# Patient Record
Sex: FEMALE | Race: OTHER | URBAN - METROPOLITAN AREA
[De-identification: names, ages, dates, MRNs, and addresses within clinical notes are randomized per-mention and may not be internally consistent; named-entity substitution may affect disease eponyms.]

---

## 2020-05-15 LAB
ANTIBODY SCREEN, EXTERNAL: NEGATIVE
CHLAMYDIA, EXTERNAL: NEGATIVE
HBSAG, EXTERNAL: NEGATIVE
HCT, EXTERNAL: 37.3
HGB, EXTERNAL: 12.9
HIV, EXTERNAL: NEGATIVE
N. GONORRHEA, EXTERNAL: NEGATIVE
RUBELLA, EXTERNAL: NORMAL
T. PALLIDUM, EXTERNAL: NEGATIVE
TYPE, ABO & RH, EXTERNAL: NORMAL

## 2020-08-07 LAB — GTT, 1 HR, GLUCOLA, EXTERNAL: 148

## 2020-08-18 LAB
GTT 120 MIN, EXTERNAL: 96
GTT 180 MIN, EXTERNAL: 114
GTT 60 MIN, EXTERNAL: 150
GTT, FASTING, EXTERNAL: 73

## 2020-10-30 LAB — GRBS, EXTERNAL: NEGATIVE

## 2020-11-13 ENCOUNTER — TRANSCRIBE ORDER (OUTPATIENT)
Dept: REGISTRATION | Age: 30
End: 2020-11-13

## 2020-11-13 ENCOUNTER — HOSPITAL ENCOUNTER (OUTPATIENT)
Dept: LAB | Age: 30
Discharge: HOME | End: 2020-11-13
Payer: MEDICAID

## 2020-11-13 DIAGNOSIS — U07.1 COVID-19: ICD-10-CM

## 2020-11-13 DIAGNOSIS — U07.1 COVID-19: Primary | ICD-10-CM

## 2020-11-13 PROCEDURE — 87635 SARS-COV-2 COVID-19 AMP PRB: CPT

## 2020-11-14 LAB — SARS-COV-2, COV2NT: NOT DETECTED

## 2020-11-21 ENCOUNTER — HOSPITAL ENCOUNTER (INPATIENT)
Age: 30
LOS: 2 days | Discharge: HOME | DRG: 560 | End: 2020-11-23
Attending: OBSTETRICS & GYNECOLOGY | Admitting: OBSTETRICS & GYNECOLOGY
Payer: MEDICAID

## 2020-11-21 LAB
BASOPHILS # BLD: 0 K/UL (ref 0–0.1)
BASOPHILS NFR BLD: 0 % (ref 0–1)
DIFFERENTIAL METHOD BLD: ABNORMAL
EOSINOPHIL # BLD: 0 K/UL (ref 0–0.4)
EOSINOPHIL NFR BLD: 0 % (ref 0–7)
ERYTHROCYTE [DISTWIDTH] IN BLOOD BY AUTOMATED COUNT: 13.3 % (ref 11.5–14.5)
HCT VFR BLD AUTO: 35.3 % (ref 35–47)
HGB BLD-MCNC: 11.5 G/DL (ref 11.5–16)
IMM GRANULOCYTES # BLD AUTO: 0.1 K/UL (ref 0–0.04)
IMM GRANULOCYTES NFR BLD AUTO: 1 % (ref 0–0.5)
LYMPHOCYTES # BLD: 1.7 K/UL (ref 0.8–3.5)
LYMPHOCYTES NFR BLD: 10 % (ref 12–49)
MCH RBC QN AUTO: 28 PG (ref 26–34)
MCHC RBC AUTO-ENTMCNC: 32.6 G/DL (ref 30–36.5)
MCV RBC AUTO: 86.1 FL (ref 80–99)
MONOCYTES # BLD: 0.7 K/UL (ref 0–1)
MONOCYTES NFR BLD: 4 % (ref 5–13)
NEUTS SEG # BLD: 14.1 K/UL (ref 1.8–8)
NEUTS SEG NFR BLD: 85 % (ref 32–75)
NRBC # BLD: 0 K/UL (ref 0–0.01)
NRBC BLD-RTO: 0 PER 100 WBC
PLATELET # BLD AUTO: 257 K/UL (ref 150–400)
PMV BLD AUTO: 10.4 FL (ref 8.9–12.9)
RBC # BLD AUTO: 4.1 M/UL (ref 3.8–5.2)
WBC # BLD AUTO: 16.6 K/UL (ref 3.6–11)

## 2020-11-21 PROCEDURE — 75810000275 HC EMERGENCY DEPT VISIT NO LEVEL OF CARE

## 2020-11-21 PROCEDURE — 75410000003 HC RECOV DEL/VAG/CSECN EA 0.5 HR: Performed by: OBSTETRICS & GYNECOLOGY

## 2020-11-21 PROCEDURE — 74011250636 HC RX REV CODE- 250/636: Performed by: OBSTETRICS & GYNECOLOGY

## 2020-11-21 PROCEDURE — 65270000029 HC RM PRIVATE

## 2020-11-21 PROCEDURE — 74011000250 HC RX REV CODE- 250: Performed by: OBSTETRICS & GYNECOLOGY

## 2020-11-21 PROCEDURE — 36415 COLL VENOUS BLD VENIPUNCTURE: CPT

## 2020-11-21 PROCEDURE — 74011250637 HC RX REV CODE- 250/637: Performed by: OBSTETRICS & GYNECOLOGY

## 2020-11-21 PROCEDURE — 0KQM0ZZ REPAIR PERINEUM MUSCLE, OPEN APPROACH: ICD-10-PCS | Performed by: OBSTETRICS & GYNECOLOGY

## 2020-11-21 PROCEDURE — 99282 EMERGENCY DEPT VISIT SF MDM: CPT

## 2020-11-21 PROCEDURE — 75410000002 HC LABOR FEE PER 1 HR: Performed by: OBSTETRICS & GYNECOLOGY

## 2020-11-21 PROCEDURE — 75410000000 HC DELIVERY VAGINAL/SINGLE: Performed by: OBSTETRICS & GYNECOLOGY

## 2020-11-21 PROCEDURE — 85025 COMPLETE CBC W/AUTO DIFF WBC: CPT

## 2020-11-21 PROCEDURE — 77030021125

## 2020-11-21 RX ORDER — OXYTOCIN/RINGER'S LACTATE 30/500 ML
10 PLASTIC BAG, INJECTION (ML) INTRAVENOUS AS NEEDED
Status: DISCONTINUED | OUTPATIENT
Start: 2020-11-21 | End: 2020-11-23 | Stop reason: HOSPADM

## 2020-11-21 RX ORDER — METHYLERGONOVINE MALEATE 0.2 MG/ML
0.2 INJECTION INTRAVENOUS ONCE
Status: COMPLETED | OUTPATIENT
Start: 2020-11-21 | End: 2020-11-21

## 2020-11-21 RX ORDER — PEPPERMINT OIL
SPIRIT ORAL
Status: DISPENSED
Start: 2020-11-21 | End: 2020-11-21

## 2020-11-21 RX ORDER — SODIUM CHLORIDE 0.9 % (FLUSH) 0.9 %
5-40 SYRINGE (ML) INJECTION EVERY 8 HOURS
Status: DISCONTINUED | OUTPATIENT
Start: 2020-11-21 | End: 2020-11-21

## 2020-11-21 RX ORDER — SIMETHICONE 80 MG
80 TABLET,CHEWABLE ORAL
Status: DISCONTINUED | OUTPATIENT
Start: 2020-11-21 | End: 2020-11-23 | Stop reason: HOSPADM

## 2020-11-21 RX ORDER — NALOXONE HYDROCHLORIDE 0.4 MG/ML
0.4 INJECTION, SOLUTION INTRAMUSCULAR; INTRAVENOUS; SUBCUTANEOUS AS NEEDED
Status: DISCONTINUED | OUTPATIENT
Start: 2020-11-21 | End: 2020-11-23 | Stop reason: HOSPADM

## 2020-11-21 RX ORDER — OXYTOCIN/RINGER'S LACTATE 30/500 ML
95 PLASTIC BAG, INJECTION (ML) INTRAVENOUS AS NEEDED
Status: DISCONTINUED | OUTPATIENT
Start: 2020-11-21 | End: 2020-11-23 | Stop reason: HOSPADM

## 2020-11-21 RX ORDER — ONDANSETRON 2 MG/ML
4 INJECTION INTRAMUSCULAR; INTRAVENOUS
Status: DISCONTINUED | OUTPATIENT
Start: 2020-11-21 | End: 2020-11-21 | Stop reason: HOSPADM

## 2020-11-21 RX ORDER — NALOXONE HYDROCHLORIDE 0.4 MG/ML
0.4 INJECTION, SOLUTION INTRAMUSCULAR; INTRAVENOUS; SUBCUTANEOUS AS NEEDED
Status: DISCONTINUED | OUTPATIENT
Start: 2020-11-21 | End: 2020-11-21 | Stop reason: HOSPADM

## 2020-11-21 RX ORDER — IBUPROFEN 800 MG/1
800 TABLET ORAL EVERY 8 HOURS
Status: DISCONTINUED | OUTPATIENT
Start: 2020-11-21 | End: 2020-11-23 | Stop reason: HOSPADM

## 2020-11-21 RX ORDER — DOCUSATE SODIUM 100 MG/1
100 CAPSULE, LIQUID FILLED ORAL
Status: DISCONTINUED | OUTPATIENT
Start: 2020-11-21 | End: 2020-11-23 | Stop reason: HOSPADM

## 2020-11-21 RX ORDER — OXYCODONE AND ACETAMINOPHEN 5; 325 MG/1; MG/1
1 TABLET ORAL
Status: DISCONTINUED | OUTPATIENT
Start: 2020-11-21 | End: 2020-11-23 | Stop reason: HOSPADM

## 2020-11-21 RX ORDER — SODIUM CHLORIDE 0.9 % (FLUSH) 0.9 %
5-40 SYRINGE (ML) INJECTION AS NEEDED
Status: DISCONTINUED | OUTPATIENT
Start: 2020-11-21 | End: 2020-11-23 | Stop reason: HOSPADM

## 2020-11-21 RX ORDER — OXYTOCIN/RINGER'S LACTATE 30/500 ML
10 PLASTIC BAG, INJECTION (ML) INTRAVENOUS AS NEEDED
Status: COMPLETED | OUTPATIENT
Start: 2020-11-21 | End: 2020-11-21

## 2020-11-21 RX ORDER — HYDROCORTISONE ACETATE PRAMOXINE HCL 2.5; 1 G/100G; G/100G
CREAM TOPICAL AS NEEDED
Status: DISCONTINUED | OUTPATIENT
Start: 2020-11-21 | End: 2020-11-21

## 2020-11-21 RX ORDER — ONDANSETRON 4 MG/1
4 TABLET, ORALLY DISINTEGRATING ORAL
Status: ACTIVE | OUTPATIENT
Start: 2020-11-21 | End: 2020-11-22

## 2020-11-21 RX ORDER — OXYTOCIN/RINGER'S LACTATE 30/500 ML
95 PLASTIC BAG, INJECTION (ML) INTRAVENOUS AS NEEDED
Status: COMPLETED | OUTPATIENT
Start: 2020-11-21 | End: 2020-11-21

## 2020-11-21 RX ORDER — SODIUM CHLORIDE, SODIUM LACTATE, POTASSIUM CHLORIDE, CALCIUM CHLORIDE 600; 310; 30; 20 MG/100ML; MG/100ML; MG/100ML; MG/100ML
125 INJECTION, SOLUTION INTRAVENOUS CONTINUOUS
Status: DISCONTINUED | OUTPATIENT
Start: 2020-11-21 | End: 2020-11-23 | Stop reason: HOSPADM

## 2020-11-21 RX ORDER — DIPHENHYDRAMINE HCL 25 MG
25 CAPSULE ORAL
Status: DISCONTINUED | OUTPATIENT
Start: 2020-11-21 | End: 2020-11-23 | Stop reason: HOSPADM

## 2020-11-21 RX ORDER — LIDOCAINE HYDROCHLORIDE 10 MG/ML
20 INJECTION INFILTRATION; PERINEURAL ONCE
Status: COMPLETED | OUTPATIENT
Start: 2020-11-21 | End: 2020-11-21

## 2020-11-21 RX ADMIN — IBUPROFEN 800 MG: 800 TABLET ORAL at 14:21

## 2020-11-21 RX ADMIN — LIDOCAINE HYDROCHLORIDE 20 ML: 10 INJECTION, SOLUTION INFILTRATION; PERINEURAL at 10:01

## 2020-11-21 RX ADMIN — OXYTOCIN 95 MILLI-UNITS/MIN: 10 INJECTION INTRAVENOUS at 10:30

## 2020-11-21 RX ADMIN — METHYLERGONOVINE MALEATE 0.2 MG: 0.2 INJECTION, SOLUTION INTRAMUSCULAR; INTRAVENOUS at 10:06

## 2020-11-21 RX ADMIN — SODIUM CHLORIDE, SODIUM LACTATE, POTASSIUM CHLORIDE, AND CALCIUM CHLORIDE 125 ML/HR: 600; 310; 30; 20 INJECTION, SOLUTION INTRAVENOUS at 08:13

## 2020-11-21 RX ADMIN — IBUPROFEN 800 MG: 800 TABLET ORAL at 22:47

## 2020-11-21 RX ADMIN — OXYTOCIN 10000 MILLI-UNITS: 10 INJECTION INTRAVENOUS at 09:58

## 2020-11-21 NOTE — PROGRESS NOTES
0739: Care assumed of pt at this time. SVE per this RN 7/100/0. Pt reassured, pt declines epidural.     0830: Pt in hands and knees, RN and family at bedside. Pt coping well. 2806: Pt reports urge to push, SVE per this RN 9.5/100/+1. Dr. Sherrie Bowden at bedside. MD made aware RN did not feel a bag of water during exams, Pt also reports she had a small amount of fluid come out at 0300 this morning. 0900: Pt beginning to push. Dr. Sherrie Bowden at bedside. 9058:  viable female infant. 7179: Delivery of placenta. MD repairing lacerations. 1000: Pitocin rate changed due to pp bleeding and ordered to be run at 999ml/hr. 1010: Repairs complete, karen-care provided, pads changed and ice pack applied to perineum. 1725: TRANSFER - OUT REPORT:    Verbal report [facility] Waldem[facility] [address]  being transferred to MIU(unit) for routine progression of care       Report consisted of patients Situation, Background, Assessment and   Recommendations(SBAR). Information from the following report(s) SBAR, Kardex, Intake/Output, MAR, Recent Results and Med Rec Status was reviewed with the receiving nurse. Lines:   Peripheral IV 20 Left Antecubital (Active)   Site Assessment Clean, dry, & intact 20 141   Phlebitis Assessment 0 20 1411   Infiltration Assessment 0 20   Dressing Status Clean, dry, & intact 20   Dressing Type Tape;Transparent 20 141   Hub Color/Line Status Green; Infusing 20 141        Opportunity for questions and clarification was provided.       Patient transported with:   Registered Nurse

## 2020-11-21 NOTE — L&D DELIVERY NOTE
Delivery Note:     Patient reached FD and pushed with good effort to deliver the fetal head in FERN position. Loose nuchal cord found and delivered through. The anterior shoulder, followed by the posterior shoulder and the rest of the body then delivered easily. This was a LBFI with Apgars of 9 and 9 at 1 and 5 minutes respectively, weight 2860g. The infant was placed on mom's abdomen. The cord was then double clamped and cut by the FOB. The placenta followed spontaneously, intact, with 3VC. Pitocin was added to the IVF and the fundus was firm to palpation. Subsequently fundus boggy with continued bleeding so methergine administered. The vagina, cervix and perineum were examined and second degree laceration was found and repaired with a 3-0 vicryl in a running fashion.  mL. No complications. Mom and baby doing well. Dr. Matteo Leal delivering.      Ana Laura Munoz MD  [facility]

## 2020-11-21 NOTE — DISCHARGE INSTRUCTIONS
Discharge Instructions for Vaginal Delivery    Patient ID:  Janis Cesar  [id number]  50 y.o.  1990    Take Home Medications       Continue taking your prenatal vitamins if you are breastfeeding. Follow-up care is a key part of your treatment and safety. Please schedule and keep appointments. Follow-up with your primary OB in 6 weeks. Activity  Avoid anything in your vagina for 6 weeks (no intercourse, tampons, or douching). You may drive unless you are taking prescription pain medications. Climbing stairs and light lifting are okay. Please avoid excessive exercise, though walking is okay- you'll be tired! Diet  Regular diet as tolerated. Be sure to drink plenty of fluids if you are breastfeeding. Wound care  If you have stitches, continue to rinse with a squirt bottle of warm water each time you void for about 7-10 days. .  Your stitches will gradually dissolve over four to eight weeks. Sitz baths are also helpful to keep the wound clean, encourage healing, and to help with pain associated with the stitches or hemorrhoids. You can use either a sitz bath basin or a bathtub filled with 2-3\" inches of plain warm water. Soak for 10 minutes 3 times a day as tolerated. Pain Management  1. Over the counter medications such as Tylenol and ibuprofen (Motrin or Advil) are ideal.  These may be taken together, alternating doses. You may  take the maximum dose:  Motrin or Advil (generic ibuprofen), either 3 tablets every 6 hours or 4 tablets every 8 hours or Tylenol (acetominophen) 1000mg every 6 hours (equivalent to 2 extra strength Tylenol). 2. You may also have a precrescription for stronger pain medication. Take only as needed and transition to over the counter medication in the next few days. Minimize amounts of the prescription medication, as it can be habit-forming and will worsen or cause constipation.  Most patients will find that within a couple of days, their pain is adequately controlled using only over-the-counter medications. 3. The prescription pain medication is mixed with Tylenol, therefore, you should not take any extra Tylenol or acetaminophen until you have reduced your prescription pain medication. 4. Add heating pad or sitz baths as needed. Add hemorrhoid wipes or ointments if needed    Constipation  1. Constipation is normal after pregnancy and delivery, especially while taking prescription narcotic pain medication. 2. Over the counter remedies including ducosate (Colace), take 1-2 capsules 1-2 times daily for soft stool as needed. You may also add/ try milk of magnesia or rectal remedies such as Dulcolax or Fleets enema. Recovery: What to Expect at Home  1. Fatigue is expected. Try to rest when you can and don't worry about doing housework or other tasks which can wait. 2. The soreness along your bottom will improve significantly over the first 2 weeks, but it may take 6 weeks before you are completely recovered. 3. Back pain or general body aches or muscle soreness are expected and should improve with acetominophen or ibuprofen. 4. Leg swelling due to pregnancy and/or IV fluids given in the hospital will take about two weeks to resolve. 5. Most women experience some form of the \"Baby Blues\" after having a baby. Feeling emotional, tearful, frustrated, anxious, sad, and irritable some of the time is normal and go away after about 2 weeks. Adequate rest and help from your family will help. Take breaks from caring for the baby. Call your doctor if your symptoms seem severe, last more than 2 weeks, or seem to be getting worse instead of better. Get help immediately if you have thoughts of wanting to hurt yourself or others! Call your doctor or seek immediate medical care if you have:  Heavy vaginal bleeding, soaking through one or more pads an hour for several hours. Foul-smelling discharge from your vagina or incision.   Consistent nausea and vomiting and cannot keep fluids down. Consistent pain that does not get better after you take pain medicine.   Sudden chest pain and shortness of breath  Signs of a blood clot: pain/ swelling/ increasing redness in your lower extremeties  Signs of infection: increased pain in your abdomen or vaginal area; red streaks, warmth, or tenderness of your breasts; fever of 100.5 F or greater

## 2020-11-21 NOTE — H&P
History & Physical    Name: Kt Scherer MRN: [id number]  SSN: [id number]    YOB: 1990  Age: 35 y.o. Sex: female        Subjective:     Estimated Date of Delivery: 20  OB History        1    Para   1    Term   1            AB        Living   1       SAB        TAB        Ectopic        Molar        Multiple   0    Live Births   1                Ms. Mandy Metzger is admitted with pregnancy at 39w2d for active labor. Prenatal course was normal. Please see prenatal records for details. History reviewed. No pertinent past medical history. History reviewed. No pertinent surgical history. Social History     Occupational History    Not on file   Tobacco Use    Smoking status: Never Smoker    Smokeless tobacco: Never Used   Substance and Sexual Activity    Alcohol use: Never     Frequency: Never    Drug use: Never    Sexual activity: Yes     Partners: Male     Birth control/protection: None     History reviewed. No pertinent family history. No Known Allergies  Prior to Admission medications    Not on File        Review of Systems: Pertinent items are noted in HPI. Objective:     Vitals:  Vitals:    20 0912 20 0914 20 0916 20 1043   BP:  111/62  (!) 148/64   Pulse:  85  71   Resp:    16   Temp:    98.4 °F (36.9 °C)   SpO2: 92%  98%         Physical Exam:  Patient without distress. Heart: normal peripheral perfusion  Lung: normal respiratory effort  Abdomen: soft, nontender  Cervical Exam: 10 cm dilated    100% effaced    +2 station    Lower Extremities:  - Edema No  EFW 6.5#  Membranes:  Spontaneous Rupture of Membranes;  Amniotic Fluid: clear fluid  Fetal Heart Rate: Reactive    Prenatal Labs:   Lab Results   Component Value Date/Time    Rubella, External Immune 05/15/2020    GrBStrep, External negative 10/30/2020    HBsAg, External negative 05/15/2020    HIV, External negative 05/15/2020    Gonorrhea, External negative 05/15/2020    Chlamydia, External negative 05/15/2020        Assessment/Plan:     Plan: Admit for Reassuring fetal status, Labor  Progressing normally  Continue expectant management, Continue plan for vaginal delivery. Group B Strep was negative.     Signed By:  Kay Seo MD     November 21, 2020

## 2020-11-21 NOTE — DISCHARGE SUMMARY
Obstetrical Discharge Summary     Name: Christopher Momin MRN: [id number]  SSN: [id number]    YOB: 1990  Age: 35 y.o. Sex: female      Admit Date: 11/21/2020    Discharge Date: 11/23/20     Attending Physician:  Joanna Sanders MD     Delivering Physician:  Michael Mcguire MD     * Admission Diagnoses:   IUP @ 39w2d      * Discharge Diagnoses:   Delivery of a VFI via TSVD by Justice Boateng MD on 11/21/2020. Apgars were 9 and 9.      2nd degree laceration      Additional Diagnoses:   Hospital Problems as of 11/21/2020 Never Reviewed          Codes Class Noted - Resolved POA    Labor and delivery, indication for care ICD-10-CM: O75.9  ICD-9-CM: 659.90  11/21/2020 - Present Unknown             Lab Results   Component Value Date/Time    Rubella, External Immune 05/15/2020    GrBStrep, External negative 10/30/2020        There is no immunization history on file for this patient. * Procedures:   TSVD         * Discharge Condition: good    [facility] Course: Normal hospital course following the delivery. * Disposition: Home    Discharge Medications: There are no discharge medications for this patient. * Follow-up Care/Patient Instructions:   Activity: Activity as tolerated  Diet: Regular Diet  Wound Care: As directed      Followup 6 weeks for PP check        Signed By:  Tye Modi MD     November 21, 2020

## 2020-11-21 NOTE — LACTATION NOTE
This note was copied from a baby's chart. Reviewed breastfeeding basics:  Supply and demand,  stomach size, early  Feeding cues, skin to skin, positioning and baby led latch-on,  latched with signs of good, deep latch vs shallow, feeding frequency and duration, and log sheet for tracking infant feedings and output. Breastfeeding Booklet and Warm line information given. Discussed typical  weight loss and the importance of infant weight checks with pediatrician 1-2 post discharge. Hand Expression Education:  Mom taught how to manually hand express her colostrum. Emphasized the importance of providing infant with valuable colostrum as infant rests skin to skin at breast.  Aware to avoid extended periods of non-feeding. Aware to offer 10-20+ drops of colostrum every 2-3 hours until infant is latching and nursing effectively. Taught the rationale behind this low tech but highly effective evidence based practice. Drops noted. Explained importance of colostrum. Discussed with mother her plan for feeding. Reviewed the benefits of exclusive breast milk feeding during the hospital stay. Informed her of the risks of using formula to supplement in the first few days of life as well as the benefits of successful breast milk feeding; referred her to the Breastfeeding booklet about this information. She acknowledges understanding of information reviewed and states that it is her plan to breast and formula to her infant. Will support her choice and offer additional information as needed. Pt will successfully establish breastfeeding by feeding in response to early feeding cues   or wake every 3h, will obtain deep latch, and will keep log of feedings/output. Taught to BF at hunger cues and or q 2-3 hrs and to offer 10-20 drops of hand expressed colostrum at any non-feeds.       Breast Assessment  Left Breast: Small , Medium  Left Nipple: Everted, Intact  Right Breast: Small , Medium  Right Nipple: Everted, Intact  Breast- Feeding Assessment  Attends Breast-Feeding Classes: No  Breast-Feeding Experience: No  Breast Trauma/Surgery: No  Type/Quality: Good  Lactation Consultant Visits  Breast-Feedings: Good

## 2020-11-21 NOTE — PROGRESS NOTES
0835 Dr. Nathan Mota called and informed of patient's arrival and SVE. TORB to admit patient and order PCN is GBS positive.

## 2020-11-21 NOTE — PROGRESS NOTES
Pt arrives to L&D unit via EMS with c/o ctx since 0300. Pt states they were q10m but are now q5m. Pt denies SROM, states she's had some vaginal spotting. +FM. Pt encouraged to void and change into gown.

## 2020-11-22 PROCEDURE — 65270000029 HC RM PRIVATE

## 2020-11-22 PROCEDURE — 74011250637 HC RX REV CODE- 250/637: Performed by: OBSTETRICS & GYNECOLOGY

## 2020-11-22 RX ADMIN — IBUPROFEN 800 MG: 800 TABLET ORAL at 15:02

## 2020-11-22 RX ADMIN — IBUPROFEN 800 MG: 800 TABLET ORAL at 06:40

## 2020-11-22 RX ADMIN — IBUPROFEN 800 MG: 800 TABLET ORAL at 23:05

## 2020-11-22 RX ADMIN — DOCUSATE SODIUM 100 MG: 100 CAPSULE ORAL at 17:09

## 2020-11-22 NOTE — PROGRESS NOTES
PostPartum Note    Traci Harry  [id number]  1990  35 y.o.    S:  Ms. Traci Harry is a 35 y.o.  PPD #1 s/p TSVD @ 39w2d. Doing well. She had a baby girl. Her lochia is like a period. She describes her pain as mild and is well controlled with PO medications. She is breast feeding and this is going well. She is ambulating and voiding. Tolerating PO intake. O:   Visit Vitals  /60 (BP 1 Location: Right arm, BP Patient Position: At rest)   Pulse 75   Temp 98.3 °F (36.8 °C)   Resp 16   SpO2 98%   Breastfeeding Unknown       Lab Results   Component Value Date/Time    WBC 16.6 (H) 2020 08:01 AM    HGB 11.5 2020 08:01 AM    HCT 35.3 2020 08:01 AM    PLATELET 989  08:01 AM    MCV 86.1 2020 08:01 AM    Hgb, External 12.9 05/15/2020    Hct, External 37.3 05/15/2020       Gen - No acute distress  Abdomen - Fundus firm, below the umbilicus   Ext - Warm, well perfused. Nontender    A/P:  PPD #1 s/p TSVD @ 39w2d doing well. 1.  Routine PP instructions/ care discussed  2. Blood type - Rh pos  3. Rubella imm  4. Circumcision n/a   5. Discharge home tomorrow   6. F/U 4-6 weeks for PP check.       Rocio Roberto MD  [facility]

## 2020-11-22 NOTE — ROUTINE PROCESS
Bedside and Verbal shift change report given to Kwabena Lee RN (oncoming nurse) by Ulises Fraga (offgoing nurse). Report included the following information SBAR, Procedure Summary, Intake/Output, MAR, Accordion, Recent Results and Med Rec Status.

## 2020-11-23 VITALS
DIASTOLIC BLOOD PRESSURE: 59 MMHG | SYSTOLIC BLOOD PRESSURE: 99 MMHG | RESPIRATION RATE: 14 BRPM | OXYGEN SATURATION: 98 % | TEMPERATURE: 98.4 F | HEART RATE: 78 BPM

## 2020-11-23 PROCEDURE — 74011250637 HC RX REV CODE- 250/637: Performed by: OBSTETRICS & GYNECOLOGY

## 2020-11-23 RX ADMIN — IBUPROFEN 800 MG: 800 TABLET ORAL at 09:09

## 2020-11-23 NOTE — PROGRESS NOTES
PostPartum Note    Ale Hansen  [id number]  1990  35 y.o.    S:  Ms. Ale Hansen is a 35 y.o.  PPD #2 s/p TSVD @ 39w2d. Doing well. She had a baby girl. Her lochia is like a period. She describes her pain as mild and is well controlled with PO medications. She is breast feeding and this is going well. She is ambulating and voiding. Tolerating PO intake. O:   Visit Vitals  BP (!) 100/56 (BP 1 Location: Right arm, BP Patient Position: At rest)   Pulse 80   Temp 98.5 °F (36.9 °C)   Resp 14   SpO2 98%   Breastfeeding Unknown       Lab Results   Component Value Date/Time    WBC 16.6 (H) 2020 08:01 AM    HGB 11.5 2020 08:01 AM    HCT 35.3 2020 08:01 AM    PLATELET 261 1900 08:01 AM    MCV 86.1 2020 08:01 AM    Hgb, External 12.9 05/15/2020    Hct, External 37.3 05/15/2020       Gen - No acute distress  Abdomen - Fundus firm, below the umbilicus   Ext - Warm, well perfused. Nontender    A/P:  PPD #2 s/p TSVD @ 39w2d doing well. 1.  Routine PP instructions/ care discussed  2. Blood type - Rh pos  3. Rubella imm  4. Circumcision n/a   5. Discharge home today   6. F/U 4-6 weeks for PP check.       Jovon Paz MD  [facility]

## 2020-11-23 NOTE — LACTATION NOTE
This note was copied from a baby's chart. Baby at breast.  Mom states nursing going well. Discussed breast feeding discharge information in [place] with family. Breast Feeding Discharge Information discussed:    Chart shows numerous feedings, void, stool WNL. Discussed Importance of monitoring outputs and feedings on first week of  Breastfeeding. Discussed ways to tell if baby getting enough, ie  Voids and stools, by day 7, baby should have at least  4-6 wet diapers a day, change in color of stool to a seedy yellow, and return to birth wt within 2 weeks with a steady increase after that. .  Follow up with pediatrician visit for weight check in 1-2 days reviewed. Discussed Breast feeding support groups and encouraged to call Warm line number, [phone]  for any breast feeding questions or problems that arise. Please leave a message and tell us what is going on. We will return your call within 24 hours. Please repeat your phone number. Feedings  Encouraged mom to attempt feeding with baby led feeding cues. Just as sucking on fingers, rooting, mouthing. Looking for 8-12 feedings in 24 hours. Don't limit baby at breast, allow baby to come off breast on it's own. Baby may want to feed  often and may increase number of feedings on second day of life. Skin to skin encouraged. In 4-6 weeks, baby may go though a growth spurt and increase feedings for several days to increase your milk supply. If baby doesn't nurse,  Mom should Pump or hand express drops, 12-18 drops, and give infant any expressed milk. If not pumping any milk, mom should contact pediatrician for possible need for supplementation. MOM's DIET    Discussed eating a healthy diet. Instructed mother to eat a variety of foods in order to get a well balanced diet.  She should consume an extra 300-500 calories per day (more than her non-pregnant requirement.) These extra calories will help provide energy needed for optimal breast milk production. Mother also encouraged to \"drink to thirst\" and it is recommended that she drink fluids such as water and fruit/vegetable juice. Nutritious snacks should be available so that she can eat throughout the day to help satisfy her hunger and maintain a good milk supply. Continue taking your Prenatal vitamins as long as you breast feed. Engorgement Care Guidelines:  Anticipatory guidance shared. If breast become engorged, to help decrease engorgement. Frequent breastfeeding encouraged, cool packs around breast after nursing may help. May take motrin or Ibuprofen as ordered by your Doctor.       Call your doctor, midwife and/or lactation consultant if:   Carlton Atkinson is having no wet or dirty diapers    Baby has dark colored urine after day 3  (should be pale yellow to clear)    Baby has dark colored stools after day 4  (should be mustard yellow, with no meconium)    Baby has fewer wet/soiled diapers or nurses less   frequently than the goals listed here    Mom has symptoms of mastitis   (sore breast with fever, chills, flu-like aching)

## 2020-11-23 NOTE — ROUTINE PROCESS
Discharge instructions given to patient via Ezio Sarkar  [facility] [place] including but not limited to signs and symptoms and who to call; restrictions and limitations; postpartum depression. All questions answered. Discharge supplies given to patient. Signature pad not working in room. Hard signed copy placed in chart. No prescriptions given to patient.

## 2022-11-02 ENCOUNTER — HOSPITAL ENCOUNTER (OUTPATIENT)
Dept: PERINATAL CARE | Age: 32
Discharge: HOME | End: 2022-11-02
Attending: OBSTETRICS & GYNECOLOGY
Payer: MEDICAID

## 2022-11-02 PROCEDURE — 76811 OB US DETAILED SNGL FETUS: CPT | Performed by: OBSTETRICS & GYNECOLOGY

## 2023-01-18 LAB
ANTIBODY SCREEN, EXTERNAL: NEGATIVE
HBSAG, EXTERNAL: NEGATIVE
HIV, EXTERNAL: NEGATIVE
RPR, EXTERNAL: NONREACTIVE
RUBELLA, EXTERNAL: NORMAL

## 2023-02-23 LAB — GRBS, EXTERNAL: NEGATIVE

## 2023-03-25 ENCOUNTER — HOSPITAL ENCOUNTER (INPATIENT)
Age: 33
LOS: 2 days | Discharge: HOME | DRG: 560 | End: 2023-03-27
Attending: OBSTETRICS & GYNECOLOGY | Admitting: ADVANCED PRACTICE MIDWIFE
Payer: MEDICAID

## 2023-03-25 LAB
ERYTHROCYTE [DISTWIDTH] IN BLOOD BY AUTOMATED COUNT: 13 % (ref 11.5–14.5)
HCT VFR BLD AUTO: 34.6 % (ref 35–47)
HGB BLD-MCNC: 11.2 G/DL (ref 11.5–16)
MCH RBC QN AUTO: 27.9 PG (ref 26–34)
MCHC RBC AUTO-ENTMCNC: 32.4 G/DL (ref 30–36.5)
MCV RBC AUTO: 86.1 FL (ref 80–99)
NRBC # BLD: 0 K/UL (ref 0–0.01)
NRBC BLD-RTO: 0 PER 100 WBC
PLATELET # BLD AUTO: 277 K/UL (ref 150–400)
PMV BLD AUTO: 9.9 FL (ref 8.9–12.9)
RBC # BLD AUTO: 4.02 M/UL (ref 3.8–5.2)
WBC # BLD AUTO: 14.5 K/UL (ref 3.6–11)

## 2023-03-25 PROCEDURE — 74011250637 HC RX REV CODE- 250/637: Performed by: STUDENT IN AN ORGANIZED HEALTH CARE EDUCATION/TRAINING PROGRAM

## 2023-03-25 PROCEDURE — 75410000003 HC RECOV DEL/VAG/CSECN EA 0.5 HR: Performed by: STUDENT IN AN ORGANIZED HEALTH CARE EDUCATION/TRAINING PROGRAM

## 2023-03-25 PROCEDURE — 75410000000 HC DELIVERY VAGINAL/SINGLE: Performed by: STUDENT IN AN ORGANIZED HEALTH CARE EDUCATION/TRAINING PROGRAM

## 2023-03-25 PROCEDURE — 36415 COLL VENOUS BLD VENIPUNCTURE: CPT

## 2023-03-25 PROCEDURE — 3E033VJ INTRODUCTION OF OTHER HORMONE INTO PERIPHERAL VEIN, PERCUTANEOUS APPROACH: ICD-10-PCS | Performed by: STUDENT IN AN ORGANIZED HEALTH CARE EDUCATION/TRAINING PROGRAM

## 2023-03-25 PROCEDURE — 74011250636 HC RX REV CODE- 250/636

## 2023-03-25 PROCEDURE — 75810000275 HC EMERGENCY DEPT VISIT NO LEVEL OF CARE

## 2023-03-25 PROCEDURE — 75410000002 HC LABOR FEE PER 1 HR: Performed by: STUDENT IN AN ORGANIZED HEALTH CARE EDUCATION/TRAINING PROGRAM

## 2023-03-25 PROCEDURE — 85027 COMPLETE CBC AUTOMATED: CPT

## 2023-03-25 PROCEDURE — 65270000029 HC RM PRIVATE

## 2023-03-25 PROCEDURE — 10907ZC DRAINAGE OF AMNIOTIC FLUID, THERAPEUTIC FROM PRODUCTS OF CONCEPTION, VIA NATURAL OR ARTIFICIAL OPENING: ICD-10-PCS | Performed by: STUDENT IN AN ORGANIZED HEALTH CARE EDUCATION/TRAINING PROGRAM

## 2023-03-25 PROCEDURE — 4A1HXCZ MONITORING OF PRODUCTS OF CONCEPTION, CARDIAC RATE, EXTERNAL APPROACH: ICD-10-PCS | Performed by: STUDENT IN AN ORGANIZED HEALTH CARE EDUCATION/TRAINING PROGRAM

## 2023-03-25 RX ORDER — OXYTOCIN/RINGER'S LACTATE 30/500 ML
PLASTIC BAG, INJECTION (ML) INTRAVENOUS
Status: COMPLETED
Start: 2023-03-25 | End: 2023-03-25

## 2023-03-25 RX ORDER — NALOXONE HYDROCHLORIDE 0.4 MG/ML
0.4 INJECTION, SOLUTION INTRAMUSCULAR; INTRAVENOUS; SUBCUTANEOUS AS NEEDED
Status: DISCONTINUED | OUTPATIENT
Start: 2023-03-25 | End: 2023-03-25

## 2023-03-25 RX ORDER — SODIUM CHLORIDE, SODIUM LACTATE, POTASSIUM CHLORIDE, CALCIUM CHLORIDE 600; 310; 30; 20 MG/100ML; MG/100ML; MG/100ML; MG/100ML
125 INJECTION, SOLUTION INTRAVENOUS CONTINUOUS
OUTPATIENT
Start: 2023-03-25

## 2023-03-25 RX ORDER — OXYTOCIN/RINGER'S LACTATE 30/500 ML
10 PLASTIC BAG, INJECTION (ML) INTRAVENOUS AS NEEDED
OUTPATIENT
Start: 2023-03-25

## 2023-03-25 RX ORDER — IBUPROFEN 800 MG/1
800 TABLET ORAL EVERY 8 HOURS
Status: DISCONTINUED | OUTPATIENT
Start: 2023-03-25 | End: 2023-03-27 | Stop reason: HOSPADM

## 2023-03-25 RX ORDER — LIDOCAINE HYDROCHLORIDE 10 MG/ML
10 INJECTION INFILTRATION; PERINEURAL ONCE
Status: DISCONTINUED | OUTPATIENT
Start: 2023-03-25 | End: 2023-03-25

## 2023-03-25 RX ORDER — OXYTOCIN/RINGER'S LACTATE 30/500 ML
87.3 PLASTIC BAG, INJECTION (ML) INTRAVENOUS AS NEEDED
OUTPATIENT
Start: 2023-03-25

## 2023-03-25 RX ORDER — HYDROMORPHONE HYDROCHLORIDE 1 MG/ML
1 INJECTION, SOLUTION INTRAMUSCULAR; INTRAVENOUS; SUBCUTANEOUS
Status: DISCONTINUED | OUTPATIENT
Start: 2023-03-25 | End: 2023-03-25

## 2023-03-25 RX ORDER — PEPPERMINT OIL
SPIRIT ORAL
Status: DISPENSED
Start: 2023-03-25 | End: 2023-03-26

## 2023-03-25 RX ORDER — ONDANSETRON 2 MG/ML
4 INJECTION INTRAMUSCULAR; INTRAVENOUS
Status: DISCONTINUED | OUTPATIENT
Start: 2023-03-25 | End: 2023-03-25

## 2023-03-25 RX ADMIN — OXYTOCIN 30000 MILLI-UNITS: 10 INJECTION, SOLUTION INTRAMUSCULAR; INTRAVENOUS at 10:15

## 2023-03-25 RX ADMIN — IBUPROFEN 800 MG: 800 TABLET, FILM COATED ORAL at 11:45

## 2023-03-25 NOTE — L&D DELIVERY NOTE
Delivery Summary      Delivery Note:     Called to LDR because patient was found to be c/c/+2. Patient pushed effectively and fetal head was delivered over perineum. Nuchal cord x 1 was not noted and reduced. The anterior shoulder followed by the posterior shoulder and body were subsequently delivered. The infant was placed on maternal abdomen. The cord was then clamped and cut after 1 minute of pulsation. Cord blood was taken. The placenta delivered spontaneously, intact, with 3VC. Pitocin was added to the IVF and the fundus was firm to palpation. The vagina, cervix and perineum were examined and first degree hemostatic laceration was found and not repaired.  mL. No complications. Mom and baby doing well. Dr. Simon Tompkins delivering. Ivonne Puga MD  [facility]     Patient: Louie Bolton MRN: [id number]  SSN: [id number]    YOB: 1990  Age: 33 y.o. Sex: female       Information for the patient's :  Ashleigh Starkey, Female Bettye Hahn [[id number]]     Labor Events:    Labor: No    Steroids:     Cervical Ripening Date/Time:       Cervical Ripening Type:     Antibiotics During Labor: No   Rupture Identifier: Sac 1    Rupture Date/Time:       Rupture Type: AROM   Amniotic Fluid Volume: Moderate    Amniotic Fluid Description: Clear    Amniotic Fluid Odor: None    Induction: None       Induction Date/Time:        Indications for Induction:      Augmentation: None   Augmentation Date/Time:      Indications for Augmentation:     Labor complications:          Additional complications:        Delivery Events:  Indications For Episiotomy:     Episiotomy: None   Perineal Laceration(s): 1st   Repaired: No    Periurethral Laceration Location:      Repaired:     Labial Laceration Location:     Repaired:     Sulcal Laceration Location:     Repaired:     Vaginal Laceration Location:     Repaired:     Cervical Laceration Location:     Repaired:     Repair Suture:     Number of Repair Packets:     Estimated Blood Loss (ml):  ml   Quantitative Blood Loss (ml)                Delivery Date: 3/25/2023    Delivery Time: 10:10 AM  Delivery Type: Vaginal, Spontaneous  Sex:  Female    Gestational Age: 36w2d   Delivery Clinician:  Sukumar Rios  Living Status: Living   Delivery Location: L&D            APGARS  One minute Five minutes Ten minutes   Skin color: 1   1        Heart rate: 2   2        Grimace: 2   2        Muscle tone: 2   2        Breathin   2        Totals: 9   9            Presentation: Vertex    Position:        Resuscitation Method:  Suctioning-bulb; Tactile Stimulation     Meconium Stained: None      Cord Information:    Complications: None  Cord around:    Delayed cord clamping? Cord clamped date/time:3/25/2023 10:11 AM  Disposition of Cord Blood: Discard    Blood Gases Sent?: No    Placenta:  Date/Time: 3/25/2023 10:14 AM  Removal: Expressed      Appearance: Normal     [place] Measurements:  Birth Weight:        Birth Length:        Head Circumference:        Chest Circumference:       Abdominal Girth: Other Providers:   ;DARY Prado;;;;;;;, Obstetrician;Primary Nurse;Primary [place] Nurse;Nicu Nurse;Neonatologist;Anesthesiologist;Crna;Nurse Practitioner;Midwife;Nursery Nurse         Group B Strep:   Lab Results   Component Value Date/Time    GrBStrep, External negative 10/30/2020 12:00 AM     Information for the patient's :  Viktoriya Beth Female Lory Stephen [[id number]]   No results found for: ABORH, PCTABR, PCTDIG, BILI, ABORHEXT, ABORH   No results for input(s): PCO2CB, PO2CB, HCO3I, SO2I, IBD, PTEMPI, SPECTI, PHICB, ISITE, IDEV, IALLEN in the last 72 hours.

## 2023-03-25 NOTE — PROGRESS NOTES
8620: T.C. Dr. Romi Baig to notify her that the patient has presented to labor in active labor, 7cm.

## 2023-03-25 NOTE — ROUTINE PROCESS
Bedside and Verbal shift change report given to [address] [facility] (oncoming nurse) by Abebe Henry (offgoing nurse). Report given with SBAR, Kardex, Intake/Output and MAR.

## 2023-03-25 NOTE — PROGRESS NOTES
1215: This RN performing fundal for primary RN. RN assisted patient to restroom. Patient voided 100 cc uirne, pads changed.

## 2023-03-25 NOTE — DISCHARGE SUMMARY
Obstetrical Discharge Summary     Name: Joel Alaniz MRN: [id number]  SSN: [id number]    YOB: 1990  Age: 33 y.o. Sex: female      Admit Date: 3/25/2023    Discharge Date: 3/27/2023     Attending Physician:  Norman Rojas MD     Delivering Physician:  Stephenie Nelson MD     * Admission Diagnoses:   IUP @ 40w4d  Labor      * Discharge Diagnoses:   Delivery of a VFI via  by Jared Siu MD on 3/25/2023. Apgars were 9 and 9. Same a above       Additional Diagnoses:      Lab Results   Component Value Date/Time    Rubella, External Immune 05/15/2020 12:00 AM    GrBStrep, External negative 10/30/2020 12:00 AM      There is no immunization history for the selected administration types on file for this patient. * Procedures:   Spontaneous vaginal delivery          * Discharge Condition: good    [facility] Course: Normal hospital course following the delivery. * Disposition: Home    Discharge Medications:   Current Discharge Medication List          * Follow-up Care/Patient Instructions: Activity: Activity as tolerated  Diet: Regular Diet  Wound Care: As directed  Followup 6 weeks for PP check        Signed By:  Jared Siu MD     2023       .

## 2023-03-25 NOTE — H&P
History & Physical    Name: Sadaf Dennison MRN: [id number]  SSN: [id number]    YOB: 1990  Age: 33 y.o. Sex: female        Subjective:     Estimated Date of Delivery: 3/21/23  OB History          2    Para   1    Term   1            AB        Living   1         SAB        IAB        Ectopic        Molar        Multiple   0    Live Births   1                Ms. Kristine Smith is admitted with pregnancy at 40w4d for active labor. Prenatal course was normal. Please see prenatal records for details. No past medical history on file. No past surgical history on file. Social History     Occupational History    Not on file   Tobacco Use    Smoking status: Never    Smokeless tobacco: Never   Substance and Sexual Activity    Alcohol use: Never    Drug use: Never    Sexual activity: Yes     Partners: Male     Birth control/protection: None     No family history on file. No Known Allergies  Prior to Admission medications    Medication Sig Start Date End Date Taking? Authorizing Provider   prenatal vit/iron fum/folic ac (RIGHT STEP PRENATAL VITAMINS PO) Take  by mouth. Yes Provider, Historical        Review of Systems: A comprehensive review of systems was negative except for that written in the HPI. Objective:     Vitals:  Vitals:    23 0814 23 0819 23 0824 23 0829   BP:       Pulse:       Resp:       Temp:       SpO2: 100% 100% 100% 100%   Weight:       Height:            Physical Exam:  Patient without distress. Abdomen: soft, nontender  Fundus: firm and non tender  Perineum: blood absent, amniotic fluid absent  Cervical Exam: 7 cm dilated    90% effaced    -1 station    Lower Extremities:  - Edema No  EFW 7.5#  Membranes:  Artificial Rupture of Membranes;  Amniotic Fluid: small amount of clear fluid  Fetal Heart Rate: Reactive    Prenatal Labs:   Lab Results   Component Value Date/Time    Rubella, External Immune 05/15/2020 12:00 AM    GrBStrep, External negative 10/30/2020 12:00 AM    HBsAg, External negative 05/15/2020 12:00 AM    HIV, External negative 05/15/2020 12:00 AM    Gonorrhea, External negative 05/15/2020 12:00 AM    Chlamydia, External negative 05/15/2020 12:00 AM        Assessment/Plan:     Plan: Admit for Labor  Progressing normally.     - Rh pos / GBS neg   - Diet reg  - Fetal well being cat 1 tracing   - Labor plan s/p AROM 2/2 pt desire to progress quickly  - Expect      Signed By:  Jared Siu MD     2023

## 2023-03-25 NOTE — PROGRESS NOTES
0815 Pt arrives to L&D with the c/o ctx that started at 0400. Pt denies any complications with pregnancy. Denies and lof or vaginal bleeding. Pt oriented to room. Call bell within reach. 0883 Stratus  [id number] used to translate plan of care. 0845 SVE 7/90/-1. Membranes intact. 0910 Pt sitting on exercise ball. 0930 Dr Eyad Nino at bedside to evaluate labor progress. SVE 7-8/90/-1 AROM clear fluid. 9964 Patient actively pushing. RN remains in continuous attendance at the bedside. Assessment & evaluation of fetal heart rate ongoing via continuous EFM. 1010 RN remained at bedside throughout pushing. EFM continuously assessed. Vaginal delivery of viable infant. 1135 Pt up to bathroom with steady gait. Pt unable to void at this time. Fundus firm at umbilicus with small amount of lochia. TRANSFER - OUT REPORT:    Verbal report given to Lawrence Serrano RN(name) on [facility]  being transferred to MIU(unit) for routine progression of care       Report consisted of patients Situation, Background, Assessment and   Recommendations(SBAR). Information from the following report(s) SBAR, Intake/Output, MAR, and Recent Results was reviewed with the receiving nurse. Lines:   Peripheral IV 03/25/23 Posterior;Right Hand (Active)        Opportunity for questions and clarification was provided.       Patient transported with:   Registered Nurse

## 2023-03-26 VITALS
OXYGEN SATURATION: 97 % | BODY MASS INDEX: 27.82 KG/M2 | SYSTOLIC BLOOD PRESSURE: 86 MMHG | DIASTOLIC BLOOD PRESSURE: 53 MMHG | HEART RATE: 82 BPM | WEIGHT: 167 LBS | RESPIRATION RATE: 16 BRPM | TEMPERATURE: 98.6 F | HEIGHT: 65 IN

## 2023-03-26 PROBLEM — Z34.90 PREGNANCY: Status: ACTIVE | Noted: 2023-03-26

## 2023-03-26 PROCEDURE — 74011250637 HC RX REV CODE- 250/637: Performed by: STUDENT IN AN ORGANIZED HEALTH CARE EDUCATION/TRAINING PROGRAM

## 2023-03-26 PROCEDURE — 65270000029 HC RM PRIVATE

## 2023-03-26 RX ORDER — OXYTOCIN/RINGER'S LACTATE 30/500 ML
10 PLASTIC BAG, INJECTION (ML) INTRAVENOUS AS NEEDED
Status: DISCONTINUED | OUTPATIENT
Start: 2023-03-26 | End: 2023-03-27 | Stop reason: HOSPADM

## 2023-03-26 RX ORDER — FOLIC ACID/MULTIVIT,IRON,MINER 0.4MG-18MG
1 TABLET ORAL DAILY
Status: DISCONTINUED | OUTPATIENT
Start: 2023-03-26 | End: 2023-03-27 | Stop reason: HOSPADM

## 2023-03-26 RX ORDER — DOCUSATE SODIUM 100 MG/1
100 CAPSULE, LIQUID FILLED ORAL
Status: DISCONTINUED | OUTPATIENT
Start: 2023-03-26 | End: 2023-03-27 | Stop reason: HOSPADM

## 2023-03-26 RX ORDER — ACETAMINOPHEN 325 MG/1
650 TABLET ORAL
Status: DISCONTINUED | OUTPATIENT
Start: 2023-03-26 | End: 2023-03-27 | Stop reason: HOSPADM

## 2023-03-26 RX ORDER — ONDANSETRON 4 MG/1
4 TABLET, ORALLY DISINTEGRATING ORAL
Status: ACTIVE | OUTPATIENT
Start: 2023-03-26 | End: 2023-03-27

## 2023-03-26 RX ORDER — DIPHENHYDRAMINE HCL 25 MG
25 CAPSULE ORAL
Status: DISCONTINUED | OUTPATIENT
Start: 2023-03-26 | End: 2023-03-27 | Stop reason: HOSPADM

## 2023-03-26 RX ORDER — SIMETHICONE 80 MG
80 TABLET,CHEWABLE ORAL
Status: DISCONTINUED | OUTPATIENT
Start: 2023-03-26 | End: 2023-03-27 | Stop reason: HOSPADM

## 2023-03-26 RX ORDER — OXYTOCIN/RINGER'S LACTATE 30/500 ML
87.3 PLASTIC BAG, INJECTION (ML) INTRAVENOUS AS NEEDED
Status: DISCONTINUED | OUTPATIENT
Start: 2023-03-26 | End: 2023-03-27 | Stop reason: HOSPADM

## 2023-03-26 RX ADMIN — IBUPROFEN 800 MG: 800 TABLET, FILM COATED ORAL at 08:40

## 2023-03-26 RX ADMIN — Medication 1 TABLET: at 08:40

## 2023-03-26 RX ADMIN — IBUPROFEN 800 MG: 800 TABLET, FILM COATED ORAL at 00:04

## 2023-03-26 NOTE — PROGRESS NOTES
PostPartum Note    Louie Bolton  [id number]  1990  33 y.o.    S:  Ms. Louie Bolton is a 33 y.o.  PPD #1 s/p  @ 40w4d. Doing well. She had a baby girl. Her lochia is like a period. She describes her pain as mild and is well controlled with PO medications. She is breast feeding and this is going well. She is ambulating and voiding. Tolerating PO intake. O:   Visit Vitals  /63 (BP 1 Location: Right upper arm, BP Patient Position: At rest)   Pulse 73   Temp 98.1 °F (36.7 °C)   Resp 16   Ht 5' 5\" (1.651 m)   Wt 75.8 kg (167 lb)   SpO2 97%   Breastfeeding Unknown   BMI 27.79 kg/m²       Gen - No acute distress  Respirations - nonlabored   Abdomen - Fundus firm below the umbilicus   Ext - Warm, well perfused, nontender     Lab Results   Component Value Date/Time    WBC 14.5 (H) 2023 08:58 AM    HGB 11.2 (L) 2023 08:58 AM    HCT 34.6 (L) 2023 08:58 AM    PLATELET 127  08:58 AM    MCV 86.1 2023 08:58 AM    Hgb, External 12.9 05/15/2020 12:00 AM    Hct, External 37.3 05/15/2020 12:00 AM         A/P:  PPD #1 s/p  @ 40w4d doing well. 1.  Routine PP instructions/ care discussed  2. Blood type - Rh pos  3. Rubella immune  4. Circumcision na   5. Discharge today    6. F/U 4-6 weeks for PP check.       Ivonne Puga MD  [facility]

## 2023-03-27 NOTE — ROUTINE PROCESS
Discharge instructions given at bedside via  phone #  Roselyn Cristina [phone] including but not limited to signs and symptoms and who to call; restrictions and limitations; postpartum depression. All questions answered. Discharge supplies given to patient. Signature pad not working in room. Hard signed copy placed in chart. Discussed via  making a follow up appt with Dr Andrew Obrien. Patient stated that she already had one set up.

## 2023-03-27 NOTE — PROGRESS NOTES
PostPartum Note    Roby Galdamez  [id number]  1990  33 y.o.    S:  Ms. Roby Galdamez is a 33 y.o.  PPD #2 s/p TSVD @ 40w4d. Doing well. She had a baby girl. Her lochia is like a period. She describes her pain as mild and is well controlled with PO medications. She is breast feeding and this is going well. She is ambulating and voiding. Tolerating PO intake. O:   Visit Vitals  BP (!) 86/53 (BP 1 Location: Left arm, BP Patient Position: At rest) Comment: nurse notifed   Pulse 82   Temp 98.6 °F (37 °C)   Resp 16   Ht 5' 5\" (1.651 m)   Wt 75.8 kg (167 lb)   SpO2 97%   Breastfeeding Unknown   BMI 27.79 kg/m²       Lab Results   Component Value Date/Time    WBC 14.5 (H) 2023 08:58 AM    HGB 11.2 (L) 2023 08:58 AM    HCT 34.6 (L) 2023 08:58 AM    PLATELET 107  08:58 AM    MCV 86.1 2023 08:58 AM    Hgb, External 12.9 05/15/2020 12:00 AM    Hct, External 37.3 05/15/2020 12:00 AM       Gen - No acute distress  Abdomen - Fundus firm, below the umbilicus   Ext - Warm, well perfused. Nontender    A/P:  PPD #2 s/p TSVD @ 40w4d doing well. 1.  Routine PP instructions/ care discussed  2. Blood type - Rh pos  3. Rubella imm  4. Circumcision n/a   5. Discharge home today   6. F/U 4-6 weeks for PP check.       Kenya Zavala MD  [facility]